# Patient Record
Sex: MALE | Race: OTHER
[De-identification: names, ages, dates, MRNs, and addresses within clinical notes are randomized per-mention and may not be internally consistent; named-entity substitution may affect disease eponyms.]

---

## 2021-11-22 ENCOUNTER — APPOINTMENT (OUTPATIENT)
Dept: ORTHOPEDIC SURGERY | Facility: CLINIC | Age: 31
End: 2021-11-22

## 2022-12-12 ENCOUNTER — APPOINTMENT (OUTPATIENT)
Dept: OTOLARYNGOLOGY | Facility: CLINIC | Age: 32
End: 2022-12-12

## 2022-12-12 VITALS
DIASTOLIC BLOOD PRESSURE: 78 MMHG | TEMPERATURE: 98.6 F | WEIGHT: 182 LBS | BODY MASS INDEX: 29.25 KG/M2 | HEIGHT: 66 IN | SYSTOLIC BLOOD PRESSURE: 113 MMHG | HEART RATE: 67 BPM | OXYGEN SATURATION: 97 %

## 2022-12-12 DIAGNOSIS — Z78.9 OTHER SPECIFIED HEALTH STATUS: ICD-10-CM

## 2022-12-12 DIAGNOSIS — R07.0 PAIN IN THROAT: ICD-10-CM

## 2022-12-12 DIAGNOSIS — Z87.19 PERSONAL HISTORY OF OTHER DISEASES OF THE DIGESTIVE SYSTEM: ICD-10-CM

## 2022-12-12 DIAGNOSIS — Z86.39 PERSONAL HISTORY OF OTHER ENDOCRINE, NUTRITIONAL AND METABOLIC DISEASE: ICD-10-CM

## 2022-12-12 PROBLEM — Z00.00 ENCOUNTER FOR PREVENTIVE HEALTH EXAMINATION: Status: ACTIVE | Noted: 2022-12-12

## 2022-12-12 PROCEDURE — 99203 OFFICE O/P NEW LOW 30 MIN: CPT | Mod: 25

## 2022-12-12 PROCEDURE — 31579 LARYNGOSCOPY TELESCOPIC: CPT

## 2022-12-12 RX ORDER — ADHESIVE TAPE 3"X 2.3 YD
50 MCG TAPE, NON-MEDICATED TOPICAL
Qty: 30 | Refills: 0 | Status: ACTIVE | COMMUNITY
Start: 2022-11-30

## 2022-12-12 RX ORDER — OMEPRAZOLE 40 MG/1
40 CAPSULE, DELAYED RELEASE ORAL
Qty: 30 | Refills: 0 | Status: ACTIVE | COMMUNITY
Start: 2022-11-15

## 2022-12-12 RX ORDER — IBUPROFEN 600 MG/1
600 TABLET, FILM COATED ORAL
Qty: 30 | Refills: 0 | Status: ACTIVE | COMMUNITY
Start: 2022-12-07

## 2022-12-12 RX ORDER — AMOXICILLIN AND CLAVULANATE POTASSIUM 875; 125 MG/1; MG/1
875-125 TABLET, COATED ORAL
Qty: 20 | Refills: 0 | Status: ACTIVE | COMMUNITY
Start: 2022-12-06

## 2022-12-12 RX ORDER — BENZONATATE 200 MG/1
200 CAPSULE ORAL
Qty: 30 | Refills: 0 | Status: ACTIVE | COMMUNITY
Start: 2022-11-22

## 2022-12-12 RX ORDER — NEOMYCIN AND POLYMYXIN B SULFATES AND HYDROCORTISONE OTIC 10; 3.5; 1 MG/ML; MG/ML; [USP'U]/ML
3.5-10000-1 SUSPENSION AURICULAR (OTIC)
Qty: 10 | Refills: 0 | Status: ACTIVE | COMMUNITY
Start: 2022-12-06

## 2022-12-12 RX ORDER — POLYVINYL ALCOHOL 14 MG/ML
1.4 SOLUTION/ DROPS OPHTHALMIC
Qty: 15 | Refills: 0 | Status: ACTIVE | COMMUNITY
Start: 2022-07-23

## 2022-12-12 RX ORDER — IBUPROFEN 800 MG/1
800 TABLET, FILM COATED ORAL
Qty: 15 | Refills: 0 | Status: ACTIVE | COMMUNITY
Start: 2022-12-06

## 2022-12-12 NOTE — HISTORY OF PRESENT ILLNESS
[de-identified] : 12/12/22\par 32M presents with throat discomfort for 2 days. He feels that there is "a foreign body" in his throat. He report discomfort upon swallowing foods/liquids. Denies dysphagia. Denies breathing difficulties. He reports voice hoarseness in the past 2 weeks since he had a URI. Voice is worse in AM then improves throughout the day. He has a history of GERD on Omeprazole. He denies ear pain. No other ENT issues.

## 2022-12-12 NOTE — PROCEDURE
[de-identified] : -\par Procedure: Flexible Laryngoscopy with Stroboscopy\par \par Pre-operative Diagnosis: throat discomfort\par Post-operative Diagnosis: right piriform sinus w/ pooling of food\par Anesthesia: Topical - 1% Lidocaine/Phenylephrine \par \par Procedure Details: \par The patient was placed in the sitting position. After decongestant and anesthesia were applied the laryngoscope was passed. The nasal cavities, nasopharynx, oropharynx, hypopharynx, and larynx were all examined. Vocal folds were examined during respiration and phonation. The following findings were noted:\par \par Findings: \par Nose: Septum is midline, turbinates are normal, nasal airways patent, mucosa normal\par Nasopharynx: Adenoids normal, no masses, eustachian tube normal\par Oropharynx: Pharyngeal walls symmetric and without lesion. Tonsils/fossae symmetric\par Hypopharynx: Hypopharynx and right pyriform sinus with area of pooling of food ? lesion.  No masses or asymmetry. No pooling of secretions.\par Larynx: Epiglottis and aryepiglottic folds were sharp and crisp bilaterally. Bilateral false vocal folds normal appearance. Airway was widely patent.\par \par Strobe Exam Ratings\par 		\par TVF Appearance: normal bilaterally \par TVF Mobility: normal mobility bilaterally \par Edema/hypertrophy: none\par Mucus on TVF: none\par Glottic Closure: adequate\par Mucosal Wave: normal\par Amplitude of Vibration: normal \par Phase: symmetric\par Supraglottic Hyperfunction: none\par Other Findings:\par \par Condition: Stable. Patient tolerated procedure well.\par \par Complications: None\par \par

## 2022-12-12 NOTE — ASSESSMENT
[FreeTextEntry1] : 32-year-old male presents with 2 days of throat discomfort.  On exam, there is evidence of pooling of food/secretions in the right piriform sinus. The rest of his exam was normal.  At this time, I am recommending rest, hydration and Tylenol Motrin as needed.  I am also recommending follow-up in 2 weeks for repeat evaluation.  If his symptoms persist and his exam remains unchanged, we will obtain a CT of the neck as well as a modified barium swallow study.\par \par \par - rest, hydration, Tylenol/Motrin as needed \par - fu 2 weeks, CT Scan/MBS if symptoms persist and exam remains same \par